# Patient Record
Sex: MALE | Race: ASIAN | Employment: OTHER | URBAN - METROPOLITAN AREA
[De-identification: names, ages, dates, MRNs, and addresses within clinical notes are randomized per-mention and may not be internally consistent; named-entity substitution may affect disease eponyms.]

---

## 2023-01-30 ENCOUNTER — APPOINTMENT (OUTPATIENT)
Dept: GENERAL RADIOLOGY | Age: 43
End: 2023-01-30
Attending: EMERGENCY MEDICINE
Payer: COMMERCIAL

## 2023-01-30 ENCOUNTER — HOSPITAL ENCOUNTER (EMERGENCY)
Age: 43
Discharge: HOME | End: 2023-01-30
Attending: EMERGENCY MEDICINE
Payer: COMMERCIAL

## 2023-01-30 VITALS
OXYGEN SATURATION: 99 % | WEIGHT: 160 LBS | DIASTOLIC BLOOD PRESSURE: 90 MMHG | SYSTOLIC BLOOD PRESSURE: 129 MMHG | HEIGHT: 65 IN | BODY MASS INDEX: 26.66 KG/M2 | HEART RATE: 73 BPM | RESPIRATION RATE: 18 BRPM | TEMPERATURE: 97.3 F

## 2023-01-30 DIAGNOSIS — R53.1 GENERALIZED WEAKNESS: ICD-10-CM

## 2023-01-30 DIAGNOSIS — R42 LIGHT HEADED: Primary | ICD-10-CM

## 2023-01-30 LAB
ALBUMIN SERPL-MCNC: 4 G/DL (ref 3.4–5)
ALBUMIN/GLOB SERPL: 1.2 (ref 0.8–1.7)
ALP SERPL-CCNC: 66 U/L (ref 45–117)
ALT SERPL-CCNC: 47 U/L (ref 16–61)
ANION GAP SERPL CALC-SCNC: 4 MMOL/L (ref 3–18)
APPEARANCE UR: CLEAR
AST SERPL-CCNC: 19 U/L (ref 10–38)
ATRIAL RATE: 73 BPM
BASOPHILS # BLD: 0 K/UL (ref 0–0.1)
BASOPHILS NFR BLD: 0 % (ref 0–2)
BILIRUB SERPL-MCNC: 0.7 MG/DL (ref 0.2–1)
BILIRUB UR QL: NEGATIVE
BUN SERPL-MCNC: 8 MG/DL (ref 7–18)
BUN/CREAT SERPL: 10 (ref 12–20)
CALCIUM SERPL-MCNC: 9.8 MG/DL (ref 8.5–10.1)
CALCULATED P AXIS, ECG09: 73 DEGREES
CALCULATED R AXIS, ECG10: -13 DEGREES
CALCULATED T AXIS, ECG11: 46 DEGREES
CHLORIDE SERPL-SCNC: 106 MMOL/L (ref 100–111)
CO2 SERPL-SCNC: 30 MMOL/L (ref 21–32)
COLOR UR: YELLOW
CREAT SERPL-MCNC: 0.8 MG/DL (ref 0.6–1.3)
D DIMER PPP FEU-MCNC: <0.27 UG/ML(FEU)
DIAGNOSIS, 93000: NORMAL
DIFFERENTIAL METHOD BLD: ABNORMAL
EOSINOPHIL # BLD: 0 K/UL (ref 0–0.4)
EOSINOPHIL NFR BLD: 1 % (ref 0–5)
ERYTHROCYTE [DISTWIDTH] IN BLOOD BY AUTOMATED COUNT: 12.9 % (ref 11.6–14.5)
GLOBULIN SER CALC-MCNC: 3.4 G/DL (ref 2–4)
GLUCOSE SERPL-MCNC: 99 MG/DL (ref 74–99)
GLUCOSE UR STRIP.AUTO-MCNC: NEGATIVE MG/DL
HCT VFR BLD AUTO: 46.9 % (ref 36–48)
HGB BLD-MCNC: 16.1 G/DL (ref 13–16)
HGB UR QL STRIP: NEGATIVE
IMM GRANULOCYTES # BLD AUTO: 0 K/UL (ref 0–0.04)
IMM GRANULOCYTES NFR BLD AUTO: 0 % (ref 0–0.5)
KETONES UR QL STRIP.AUTO: NEGATIVE MG/DL
LEUKOCYTE ESTERASE UR QL STRIP.AUTO: NEGATIVE
LIPASE SERPL-CCNC: 83 U/L (ref 73–393)
LYMPHOCYTES # BLD: 1.8 K/UL (ref 0.9–3.6)
LYMPHOCYTES NFR BLD: 34 % (ref 21–52)
MCH RBC QN AUTO: 30 PG (ref 24–34)
MCHC RBC AUTO-ENTMCNC: 34.3 G/DL (ref 31–37)
MCV RBC AUTO: 87.3 FL (ref 78–100)
MONOCYTES # BLD: 0.4 K/UL (ref 0.05–1.2)
MONOCYTES NFR BLD: 8 % (ref 3–10)
NEUTS SEG # BLD: 3 K/UL (ref 1.8–8)
NEUTS SEG NFR BLD: 56 % (ref 40–73)
NITRITE UR QL STRIP.AUTO: NEGATIVE
NRBC # BLD: 0 K/UL (ref 0–0.01)
NRBC BLD-RTO: 0 PER 100 WBC
P-R INTERVAL, ECG05: 124 MS
PH UR STRIP: 6 (ref 5–8)
PLATELET # BLD AUTO: 263 K/UL (ref 135–420)
PMV BLD AUTO: 9.9 FL (ref 9.2–11.8)
POTASSIUM SERPL-SCNC: 4 MMOL/L (ref 3.5–5.5)
PROT SERPL-MCNC: 7.4 G/DL (ref 6.4–8.2)
PROT UR STRIP-MCNC: NEGATIVE MG/DL
Q-T INTERVAL, ECG07: 380 MS
QRS DURATION, ECG06: 90 MS
QTC CALCULATION (BEZET), ECG08: 418 MS
RBC # BLD AUTO: 5.37 M/UL (ref 4.35–5.65)
SODIUM SERPL-SCNC: 140 MMOL/L (ref 136–145)
SP GR UR REFRACTOMETRY: 1 (ref 1–1.03)
TROPONIN-HIGH SENSITIVITY: 6 NG/L (ref 0–78)
TROPONIN-HIGH SENSITIVITY: 7 NG/L (ref 0–78)
UROBILINOGEN UR QL STRIP.AUTO: 0.2 EU/DL (ref 0.2–1)
VENTRICULAR RATE, ECG03: 73 BPM
WBC # BLD AUTO: 5.3 K/UL (ref 4.6–13.2)

## 2023-01-30 PROCEDURE — 83690 ASSAY OF LIPASE: CPT

## 2023-01-30 PROCEDURE — 96360 HYDRATION IV INFUSION INIT: CPT

## 2023-01-30 PROCEDURE — 99285 EMERGENCY DEPT VISIT HI MDM: CPT

## 2023-01-30 PROCEDURE — 80053 COMPREHEN METABOLIC PANEL: CPT

## 2023-01-30 PROCEDURE — 93005 ELECTROCARDIOGRAM TRACING: CPT

## 2023-01-30 PROCEDURE — 84484 ASSAY OF TROPONIN QUANT: CPT

## 2023-01-30 PROCEDURE — 85025 COMPLETE CBC W/AUTO DIFF WBC: CPT

## 2023-01-30 PROCEDURE — 85379 FIBRIN DEGRADATION QUANT: CPT

## 2023-01-30 PROCEDURE — 71046 X-RAY EXAM CHEST 2 VIEWS: CPT

## 2023-01-30 PROCEDURE — 81003 URINALYSIS AUTO W/O SCOPE: CPT

## 2023-01-30 PROCEDURE — 74011250636 HC RX REV CODE- 250/636: Performed by: PHYSICIAN ASSISTANT

## 2023-01-30 RX ADMIN — SODIUM CHLORIDE 1000 ML: 9 INJECTION, SOLUTION INTRAVENOUS at 13:28

## 2023-01-30 NOTE — Clinical Note
[facility] [place]  [facility]  [address]  [facility] [address]  [address] [phone]    Work/School Note    Date: 1/30/2023    To Whom It May concern:      Edgar Neal was seen and treated today in the emergency room by the following provider(s):  Attending Provider: Henry Reich MD  Physician Assistant: Pauline Schneider PA-C. Edgar Neal is excused from work/school on 01/30/23. He is clear to return to work/school on 01/31/23.         Sincerely,          Vannesa Frey

## 2023-01-30 NOTE — ED TRIAGE NOTES
Pt arrived with c/o sudden onset of nausea, head pressure and inability to concentrate. Pt states he had covid 3 weeks ago and is having continued SOB on exertion. Pt endorses hx of migraines. Pt endorses stabbing pain on the left side through the left shoulder with deep inspiration. Pt is ambulatory to triage. Pt alert and oriented x4. Vital signs are stable.

## 2023-01-30 NOTE — ED PROVIDER NOTES
EMERGENCY DEPARTMENT HISTORY & PHYSICAL EXAM    [facility]  1/30/2023, 2:41 PM    Clinical Impression:  1. Light headed    2. Generalized weakness        Assessment/Differential Diagnosis:     Ddx syncopal episode, cardiac event, PE, post covid complication, weakness, dehydration, electrolyte abnormality all considered. ED Course:   Initial assessment performed. The patients presenting problems have been discussed, and they are in agreement with the care plan formulated and outlined with them. I have encouraged them to ask questions as they arise throughout their visit. Pt here with sudden onset of any nauseous, generalized weakness and slight tunnel vision. This occurred while he was standing, at school, and drooling. He sat down with his symptoms improving. When he went to stand his symptoms got worse. For that reason EMS was called and patient was brought here for evaluation. He states this has happened in the past but can give me no additional information. Patient was diagnosed with COVID 3 weeks ago. He feels he is recovering well. Some occasional left sided sharp chest  pain with deep inspiration. NO abd pain. No unilateral weakness, headache, difficulty swallowing or confusion. Exam with pt appearing well. No concerns on exam.    EKG NSR  CXR neg  Labs with no acute concerns, ddimer neg, troponin neg  Orthostatics with no change  Pt given 1L NS    Recheck with pt feeling well. No concerns. Ambulating without symptoms. Medical Chart Review:  I have reviewed triage nursing documentation. Review of old medical records with the following pertinent information:       Disposition:  Home  in good condition. Chief Complaint   Patient presents with    Shortness of Breath    Nausea     HPI:    The history is provided by patient. No  used.     Edgar Madrigal is a 38 y.o. male presenting to the Emergency Department with complaints of sudden onset of nausea, feeling lightheaded, slight tunnel vision and generalized weakness. This occurred just prior to arrival.  Patient was standing at work and training holding a drill when symptoms started. There was no vomiting. Symptoms improved when he sat and then worsened when he stood back up. Patient states he did have breakfast and drinks plenty of fluids daily. He feels he may have had symptoms like this in the past but cannot give me no further information. Laying on the stretcher now he is having no symptoms. There is been no fever, chills. Patient was diagnosed with COVID 3 weeks ago but feels he has recovered well. He has noticed some episodes of sharp pain to his left back radiating to his left anterior chest with deep inspiration. This does not occur all the time. He denies any exertional shortness of breath or chest pain. No abdominal symptoms other than the nausea. Patient does go to the gym daily with no symptoms at that time. Patient is otherwise healthy with no chronic medical problems. He takes no chronic medications. No prior surgeries or hospital stays. I have reviewed all PMHX, FMHX and Social Hx as entered into the medical record in the chart below using the Epic Template. Review of Systems:  Constitutional:  Neg for fever,  chills, weight changes. ENT:  Neg for Sore throat, runny nose, or other URI symptoms  Respiratory:  neg for cough, no shortness of breath, or wheezing  Cardiovascular:  + chest pain, chest pressure, palpitations. GI:  no vomiting, no diarrhea, no abdominal pain. : no dysuria, no frequency, no urgency. No Flank pain. MSK no joint pain, no myalgias  Integumentary: no rashes, lesions, or skin irritation. Neurological: no headaches, dizziness  All other systems reviewed negative except was is positive in ROS and HPI. Past Medical History:  No past medical history on file.     Past Surgical History:  No past surgical history on file. Family History:  No family history on file. Social History: Allergies:  No Known Allergies    Vital Signs:  Vitals:    01/30/23 1334 01/30/23 1335 01/30/23 1336 01/30/23 1418   BP: 121/81 131/82 112/78 (!) 129/90   Pulse: 72 70 73    Resp:       Temp:       SpO2:    99%   Weight:       Height:         Physical Exam:  Vital Signs Reviewed. Nursing Notes Reviewed. Constitutional:  Well developed, well nourished patient. Appearance and behavior are age and situation appropriate. Head: Normocephalic, Atraumatic  Eyes: Conjunctiva clear, lids normal. Sclera anicteric. PERRL.  ENT:  gross hearing normal, throat normal   Neck:  Supple, FROM. Trachea midline. No nuchal rigidity. Lungs: Lungs CTAB. No wheezes, rales or rhonchi. No respiratory distress, tachypnea or accessory muscle use. No cough. Cardiac:  RRR without murmur. No peripheral edema  Abdomen: soft, nontender, normal  bowel sounds, no mass. Skin without rash or signs of trauma. Extremities:  no pedal edema  Neuro:  A&O , no obvious neuro deficit with general inspection.   Skin:  Warm, dry, no rash  Back:  No CVAT    Diagnostics:    Labs -     Recent Results (from the past 12 hour(s))   EKG, 12 LEAD, INITIAL    Collection Time: 01/30/23 12:40 PM   Result Value Ref Range    Ventricular Rate 73 BPM    Atrial Rate 73 BPM    P-R Interval 124 ms    QRS Duration 90 ms    Q-T Interval 380 ms    QTC Calculation (Bezet) 418 ms    Calculated P Axis 73 degrees    Calculated R Axis -13 degrees    Calculated T Axis 46 degrees    Diagnosis       Normal sinus rhythm  Normal ECG  No previous ECGs available     CBC WITH AUTOMATED DIFF    Collection Time: 01/30/23  1:00 PM   Result Value Ref Range    WBC 5.3 4.6 - 13.2 K/uL    RBC 5.37 4.35 - 5.65 M/uL    HGB 16.1 (H) 13.0 - 16.0 g/dL    HCT 46.9 36.0 - 48.0 %    MCV 87.3 78.0 - 100.0 FL    MCH 30.0 24.0 - 34.0 PG    MCHC 34.3 31.0 - 37.0 g/dL    RDW 12.9 11.6 - 14.5 %    PLATELET 846 764 - 346 K/uL    MPV 9.9 9.2 - 11.8 FL    NRBC 0.0 0  WBC    ABSOLUTE NRBC 0.00 0.00 - 0.01 K/uL    NEUTROPHILS 56 40 - 73 %    LYMPHOCYTES 34 21 - 52 %    MONOCYTES 8 3 - 10 %    EOSINOPHILS 1 0 - 5 %    BASOPHILS 0 0 - 2 %    IMMATURE GRANULOCYTES 0 0.0 - 0.5 %    ABS. NEUTROPHILS 3.0 1.8 - 8.0 K/UL    ABS. LYMPHOCYTES 1.8 0.9 - 3.6 K/UL    ABS. MONOCYTES 0.4 0.05 - 1.2 K/UL    ABS. EOSINOPHILS 0.0 0.0 - 0.4 K/UL    ABS. BASOPHILS 0.0 0.0 - 0.1 K/UL    ABS. IMM. GRANS. 0.0 0.00 - 0.04 K/UL    DF AUTOMATED     METABOLIC PANEL, COMPREHENSIVE    Collection Time: 01/30/23  1:00 PM   Result Value Ref Range    Sodium 140 136 - 145 mmol/L    Potassium 4.0 3.5 - 5.5 mmol/L    Chloride 106 100 - 111 mmol/L    CO2 30 21 - 32 mmol/L    Anion gap 4 3.0 - 18 mmol/L    Glucose 99 74 - 99 mg/dL    BUN 8 7.0 - 18 MG/DL    Creatinine 0.80 0.6 - 1.3 MG/DL    BUN/Creatinine ratio 10 (L) 12 - 20      eGFR >60 >60 ml/min/1.73m2    Calcium 9.8 8.5 - 10.1 MG/DL    Bilirubin, total 0.7 0.2 - 1.0 MG/DL    ALT (SGPT) 47 16 - 61 U/L    AST (SGOT) 19 10 - 38 U/L    Alk.  phosphatase 66 45 - 117 U/L    Protein, total 7.4 6.4 - 8.2 g/dL    Albumin 4.0 3.4 - 5.0 g/dL    Globulin 3.4 2.0 - 4.0 g/dL    A-G Ratio 1.2 0.8 - 1.7     LIPASE    Collection Time: 01/30/23  1:00 PM   Result Value Ref Range    Lipase 83 73 - 393 U/L   TROPONIN-HIGH SENSITIVITY    Collection Time: 01/30/23  1:00 PM   Result Value Ref Range    Troponin-High Sensitivity 6 0 - 78 ng/L   D DIMER    Collection Time: 01/30/23  1:00 PM   Result Value Ref Range    D DIMER <0.27 <0.46 ug/ml(FEU)   URINALYSIS W/ RFLX MICROSCOPIC    Collection Time: 01/30/23  2:42 PM   Result Value Ref Range    Color YELLOW      Appearance CLEAR      Specific gravity 1.005 1.005 - 1.030      pH (UA) 6.0 5.0 - 8.0      Protein Negative NEG mg/dL    Glucose Negative NEG mg/dL    Ketone Negative NEG mg/dL    Bilirubin Negative NEG      Blood Negative NEG      Urobilinogen 0.2 0.2 - 1.0 EU/dL    Nitrites Negative NEG Leukocyte Esterase Negative NEG         Radiologic Studies -   XR CHEST PA LAT   Final Result   No acute cardiopulmonary disease. CT Results  (Last 48 hours)      None          CXR Results  (Last 48 hours)                 01/30/23 1307  XR CHEST PA LAT Final result    Impression:  No acute cardiopulmonary disease. Narrative: Indication: Shortness of breath. Exam: PA and lateral views of the chest.       There is no prior study for direct comparison. Findings: Cardiomediastinal silhouette is within normal limits. Lungs are clear   bilaterally. Pleural spaces are normal. Osseous structures are intact. Medications given in the ED-  Medications   sodium chloride 0.9 % bolus infusion 1,000 mL (0 mL IntraVENous IV Completed 1/30/23 2245)       Please note that this dictation was completed with [employer], the computer voice recognition software. Quite often unanticipated grammatical, syntax, homophones, and other interpretive errors are inadvertently transcribed by the computer software. Please disregard these errors. Please excuse any errors that have escaped final proofreading.

## 2023-01-30 NOTE — DISCHARGE INSTRUCTIONS
Your work-up today shows no acute changes in your blood work. EKG normal  Chest x-ray normal    There may have been a degree of dehydration that caused her symptoms today. Stay well-hydrated. Do not drink just plain water but drink fluids with salt and sugars.   Healthy diet  Follow-up with your doctor in the next few days for reevaluation  Return to ER if any new or worsening symptoms or new concerns

## 2023-01-30 NOTE — Clinical Note
[facility] [place]  [facility]  [address]  [facility] [id number] [phone]    Work/School Note    Date: 1/30/2023    To Whom It May concern:      Edgar Cazares was seen and treated today in the emergency room by the following provider(s):  Attending Provider: Nicole Lopez MD  Physician Assistant: Isabela Brown PA-C. Edgar Cazares is excused from work/school on 01/30/23. He is clear to return to work/school on 01/31/23.         Sincerely,          Richie Mart RN

## 2023-01-30 NOTE — Clinical Note
[facility] [place]  [facility]  [address]  [facility] [address] [phone]    Work/School Note    Date: 1/30/2023    To Whom It May concern:      Edgar Arndt was seen and treated today in the emergency room by the following provider(s):  Attending Provider: Dario Hampton MD  Physician Assistant: Shlomo Gilliland PA-C. Edgar Arndt is excused from work/school on 01/30/23. He is clear to return to work/school on 01/31/23.         Sincerely,          Amanda Bowser RN

## 2023-01-30 NOTE — Clinical Note
[facility] [place]  THE [facility] EMERGENCY DEPT  [address] [facility] [address] [phone]    Work/School Note    Date: 1/30/2023    To Whom It May concern:      Edgar Gasca was seen and treated today in the emergency room by the following provider(s):  Attending Provider: Phil Poole MD  Physician Assistant: Villa Tidwell PA-C. Edgar Gasca is excused from work/school on 01/30/23. He is clear to return to work/school on 01/31/23.         Sincerely,          Estelle Gilliam PA-C

## 2023-01-30 NOTE — Clinical Note
[facility] [place]  [facility]  [address] [facility] [id number]  [phone]    Work/School Note    Date: 1/30/2023    To Whom It May concern:      Edgar Nina was seen and treated today in the emergency room by the following provider(s):  Attending Provider: Nakul Rosenthal MD  Physician Assistant: Daphne Taylor PA-C. Edgar Nina is excused from work/school on 01/30/23. He is clear to return to work/school on 01/31/23.         Sincerely,          Nai Mckeon